# Patient Record
Sex: FEMALE | Race: ASIAN | URBAN - METROPOLITAN AREA
[De-identification: names, ages, dates, MRNs, and addresses within clinical notes are randomized per-mention and may not be internally consistent; named-entity substitution may affect disease eponyms.]

---

## 2017-07-11 ENCOUNTER — OFFICE VISIT (OUTPATIENT)
Dept: FAMILY MEDICINE | Facility: CLINIC | Age: 27
End: 2017-07-11
Payer: COMMERCIAL

## 2017-07-11 VITALS
HEART RATE: 90 BPM | BODY MASS INDEX: 20.98 KG/M2 | TEMPERATURE: 97.9 F | SYSTOLIC BLOOD PRESSURE: 118 MMHG | WEIGHT: 114 LBS | DIASTOLIC BLOOD PRESSURE: 70 MMHG | OXYGEN SATURATION: 98 % | RESPIRATION RATE: 16 BRPM | HEIGHT: 62 IN

## 2017-07-11 DIAGNOSIS — R30.0 DYSURIA: ICD-10-CM

## 2017-07-11 DIAGNOSIS — N30.01 ACUTE CYSTITIS WITH HEMATURIA: Primary | ICD-10-CM

## 2017-07-11 LAB
ALBUMIN UR-MCNC: 100 MG/DL
APPEARANCE UR: ABNORMAL
BACTERIA #/AREA URNS HPF: ABNORMAL /HPF
BILIRUB UR QL STRIP: ABNORMAL
COLOR UR AUTO: ABNORMAL
GLUCOSE UR STRIP-MCNC: NEGATIVE MG/DL
HGB UR QL STRIP: ABNORMAL
KETONES UR STRIP-MCNC: NEGATIVE MG/DL
LEUKOCYTE ESTERASE UR QL STRIP: NEGATIVE
NITRATE UR QL: POSITIVE
NON-SQ EPI CELLS #/AREA URNS LPF: ABNORMAL /LPF
PH UR STRIP: 6 PH (ref 5–7)
RBC #/AREA URNS AUTO: >100 /HPF (ref 0–2)
RENAL EPI CELLS #/AREA URNS HPF: ABNORMAL /HPF
SP GR UR STRIP: 1.02 (ref 1–1.03)
URN SPEC COLLECT METH UR: ABNORMAL
UROBILINOGEN UR STRIP-ACNC: 1 EU/DL (ref 0.2–1)
WBC #/AREA URNS AUTO: ABNORMAL /HPF (ref 0–2)

## 2017-07-11 PROCEDURE — 81001 URINALYSIS AUTO W/SCOPE: CPT | Performed by: NURSE PRACTITIONER

## 2017-07-11 PROCEDURE — 99213 OFFICE O/P EST LOW 20 MIN: CPT | Performed by: NURSE PRACTITIONER

## 2017-07-11 PROCEDURE — 87086 URINE CULTURE/COLONY COUNT: CPT | Performed by: NURSE PRACTITIONER

## 2017-07-11 PROCEDURE — 87088 URINE BACTERIA CULTURE: CPT | Performed by: NURSE PRACTITIONER

## 2017-07-11 PROCEDURE — 87186 SC STD MICRODIL/AGAR DIL: CPT | Performed by: NURSE PRACTITIONER

## 2017-07-11 RX ORDER — SULFAMETHOXAZOLE/TRIMETHOPRIM 800-160 MG
1 TABLET ORAL 2 TIMES DAILY
Qty: 6 TABLET | Refills: 0 | Status: SHIPPED | OUTPATIENT
Start: 2017-07-11 | End: 2017-07-14

## 2017-07-11 NOTE — NURSING NOTE
"Chief Complaint   Patient presents with     Urinary Problem       Initial BP (!) 138/96  Pulse 90  Temp 97.9  F (36.6  C) (Oral)  Resp 16  Ht 5' 1.5\" (1.562 m)  Wt 114 lb (51.7 kg)  LMP 06/26/2017  SpO2 98%  Breastfeeding? No  BMI 21.19 kg/m2 Estimated body mass index is 21.19 kg/(m^2) as calculated from the following:    Height as of this encounter: 5' 1.5\" (1.562 m).    Weight as of this encounter: 114 lb (51.7 kg).  Medication Reconciliation: complete       Brad Constantino MA       "

## 2017-07-11 NOTE — PATIENT INSTRUCTIONS
"Your urine sample today does show that you have an infection.  Take the antibiotics as prescribed.  You can also take over the counter uristat or azo for the bladder irritation and pain.  Push fluids, drink lots of fluids, void frequently, wipe from front to back etc.  Follow up with me with any lingering or concerning issues.         * BLADDER INFECTION,Female (Adult)    A bladder infection (\"cystitis\" or \"UTI\") usually causes a constant urge to urinate and a burning when passing urine. Urine may be cloudy, smelly or dark. There may be pain in the lower abdomen. A bladder infection occurs when bacteria from the vaginal area enter the bladder opening (urethra). This can occur from sexual intercourse, wearing tight clothing, dehydration and other factors.  HOME CARE:  1. Drink lots of fluids (at least 6-8 glasses a day, unless you must restrict fluids for other medical reasons). This will force the medicine into your urinary system and flush the bacteria out of your body. Cranberry juice has been shown to help clear out the bacteria.  2. Avoid sexual intercourse until your symptoms are gone.  3. A bladder infection is treated with antibiotics. You may also be given Pyridium (generic = phenazopyridine) to reduce the burning sensation. This medicine will cause your urine to become a bright orange color. The orange urine may stain clothing. You may wear a pad or panty-liner to protect clothing.  PREVENTING FUTURE INFECTIONS:  1. Always wipe from front to back after a bowel movement.  2. Keep the genital area clean and dry.  3. Drink plenty of fluids each day to avoid dehydration.  4. Urinate right after intercourse to flush out the bladder.  5. Wear cotton underwear and cotton-lined panty hose; avoid tight-fitting pants.  6. If you are on birth control pills and are having frequent bladder infections, discuss with your doctor.  FOLLOW UP: Return to this facility or see your doctor if ALL symptoms are not gone after " three days of treatment.  GET PROMPT MEDICAL ATTENTION if any of the following occur:    Fever over 101 F (38.3 C)    No improvement by the third day of treatment    Increasing back or abdominal pain    Repeated vomiting; unable to keep medicine down    Weakness, dizziness or fainting    Vaginal discharge    Pain, redness or swelling in the labia (outer vaginal area)    1258-6993 [employer], [address] [place] [address], [place] [address]. All rights reserved. This information is not intended as a substitute for professional medical care. Always follow your healthcare professional's instructions.

## 2017-07-11 NOTE — PROGRESS NOTES
"  SUBJECTIVE:                                                    Lili Jesus is a 27 year old female who presents to clinic today for the following health issues:      URINARY TRACT SYMPTOMS      Duration: Sunday night (7/9/2017)    Description  Onset 2 days ago with c/o lower pelvic pain and bladder \"tightening.\"  Her symptoms improved yesterday with a Danish home remedy (uncertain what it was) but today her symptoms are worse again.  Today she has noticed that her urine is reddish orange.  No fever or chills.      Intensity:  moderate, severe    Accompanying signs and symptoms:  Fever/chills: no   Flank pain no   Nausea and vomiting: no   Vaginal symptoms: none  Abdominal/Pelvic Pain: YES- low abdominal pain. Tightening feeling    History  History of frequent UTI's: YES  History of kidney stones: no   Sexually Active: YES  Possibility of pregnancy: No    Precipitating or alleviating factors: None    Therapies tried and outcome: pyridium    Outcome:       Problem list and histories reviewed & adjusted, as indicated.  Additional history: as documented    Patient Active Problem List   Diagnosis     Malpositioned IUD     Past Surgical History:   Procedure Laterality Date     LAPAROSCOPY OPERATIVE ADULT N/A 3/20/2015    Procedure: LAPAROSCOPY OPERATIVE ADULT;  Surgeon: Antonietta Alfonso MD;  Location: [facility]     NO HISTORY OF SURGERY         Social History   Substance Use Topics     Smoking status: Current Every Day Smoker     Types: Cigarettes     Smokeless tobacco: Never Used      Comment: smokes about 4 cigarettes per day     Alcohol use No     Family History   Problem Relation Age of Onset     Family History Negative Other          Current Outpatient Prescriptions   Medication Sig Dispense Refill     ibuprofen (ADVIL,MOTRIN) 600 MG tablet Take 1 tablet (600 mg) by mouth every 6 hours as needed for pain (mild) (Patient not taking: Reported on 7/11/2017) 30 tablet 0     No Known Allergies  No lab results found.   BP " "Readings from Last 3 Encounters:   07/11/17 (!) 138/96   03/20/15 123/83   02/24/15 101/64    Wt Readings from Last 3 Encounters:   07/11/17 114 lb (51.7 kg)   03/20/15 111 lb 15.9 oz (50.8 kg)   02/24/15 116 lb 4.8 oz (52.8 kg)                  Labs reviewed in EPIC    Reviewed and updated as needed this visit by clinical staff       Reviewed and updated as needed this visit by Provider         ROS:  Constitutional, HEENT, cardiovascular, pulmonary, GI, , musculoskeletal, neuro, skin, endocrine and psych systems are negative, except as otherwise noted.    OBJECTIVE:     BP (!) 138/96  Pulse 90  Temp 97.9  F (36.6  C) (Oral)  Resp 16  Ht 5' 1.5\" (1.562 m)  Wt 114 lb (51.7 kg)  LMP 06/26/2017  SpO2 98%  Breastfeeding? No  BMI 21.19 kg/m2  Body mass index is 21.19 kg/(m^2).  GENERAL: healthy, alert and no distress. Happy, smiling, conversing.  NECK: no adenopathy  RESP: lungs clear to auscultation - no rales, rhonchi or wheezes  CV: regular rate and rhythm, normal S1 S2, no S3 or S4, no murmur, click or rub, no peripheral edema and peripheral pulses strong  ABDOMEN: soft, nontender, no hepatosplenomegaly, no masses and bowel sounds normal.  No rebound or guarding.  No CVA tenderness with percussion.  She does complain of lower mid pelvic discomfort with palpation.  SKIN: warm and dry    Results for orders placed or performed in visit on 07/11/17   *UA reflex to Microscopic and Culture ([facility] (except [place] and [place])   Result Value Ref Range    Color Urine Yani     Appearance Urine Cloudy     Glucose Urine Negative NEG mg/dL    Bilirubin Urine (A) NEG     Small  This is an unconfirmed screening test result. A positive result may be false.      Ketones Urine Negative NEG mg/dL    Specific Gravity Urine 1.020 1.003 - 1.035    Blood Urine Large (A) NEG    pH Urine 6.0 5.0 - 7.0 pH    Protein Albumin Urine 100 (A) NEG mg/dL    Urobilinogen Urine 1.0 0.2 - 1.0 EU/dL    Nitrite Urine " Positive (A) NEG    Leukocyte Esterase Urine Negative NEG    Source Midstream Urine    Urine Microscopic   Result Value Ref Range    WBC Urine O - 2 0 - 2 /HPF    RBC Urine >100 (A) 0 - 2 /HPF    Squamous Epithelial /LPF Urine Few FEW /LPF    Renal Tub Epi Few (A) NEG /HPF    Bacteria Urine Few (A) NEG /HPF         ASSESSMENT/PLAN:     (N30.01) Acute cystitis with hematuria  (primary encounter diagnosis)  Comment: Acute  Plan: sulfamethoxazole-trimethoprim (BACTRIM         DS/SEPTRA DS) 800-160 MG per tablet        I discussed and reviewed with the patient her abnormal urine sample, the blood, etc.  I do have some concerns at this may be higher than bladder due to the protein seen, however this patient is nontender to her flank areas, afebrile, etc.  For now we will treat his significant bladder infection.  I discussed and reviewed the significance of increasing her water intake, taking her medications as prescribed, voiding frequently, wiping from front to back, etc.  She is to monitor for new or worsening symptoms.  I did ask her to follow up for let me know if her symptoms did not significantly improve within the next 2-3 days. The urine culture is pending.  I would add additional antibiotics if needed.  Consider urology consultation, but this is not seen necessary today.      (R30.0) Dysuria  Comment:   Plan: *UA reflex to Microscopic and Culture ([place]         and [facility] (except [facility] [place] and         [place]), Urine Microscopic, Urine Culture         Aerobic Bacterial              Patient Instructions   Your urine sample today does show that you have an infection.  Take the antibiotics as prescribed.  You can also take over the counter uristat or azo for the bladder irritation and pain.  Push fluids, drink lots of fluids, void frequently, wipe from front to back etc.  Follow up with me with any lingering or concerning issues.         * BLADDER INFECTION,Female (Adult)    A bladder infection  "(\"cystitis\" or \"UTI\") usually causes a constant urge to urinate and a burning when passing urine. Urine may be cloudy, smelly or dark. There may be pain in the lower abdomen. A bladder infection occurs when bacteria from the vaginal area enter the bladder opening (urethra). This can occur from sexual intercourse, wearing tight clothing, dehydration and other factors.  HOME CARE:  1. Drink lots of fluids (at least 6-8 glasses a day, unless you must restrict fluids for other medical reasons). This will force the medicine into your urinary system and flush the bacteria out of your body. Cranberry juice has been shown to help clear out the bacteria.  2. Avoid sexual intercourse until your symptoms are gone.  3. A bladder infection is treated with antibiotics. You may also be given Pyridium (generic = phenazopyridine) to reduce the burning sensation. This medicine will cause your urine to become a bright orange color. The orange urine may stain clothing. You may wear a pad or panty-liner to protect clothing.  PREVENTING FUTURE INFECTIONS:  1. Always wipe from front to back after a bowel movement.  2. Keep the genital area clean and dry.  3. Drink plenty of fluids each day to avoid dehydration.  4. Urinate right after intercourse to flush out the bladder.  5. Wear cotton underwear and cotton-lined panty hose; avoid tight-fitting pants.  6. If you are on birth control pills and are having frequent bladder infections, discuss with your doctor.  FOLLOW UP: Return to this facility or see your doctor if ALL symptoms are not gone after three days of treatment.  GET PROMPT MEDICAL ATTENTION if any of the following occur:    Fever over 101 F (38.3 C)    No improvement by the third day of treatment    Increasing back or abdominal pain    Repeated vomiting; unable to keep medicine down    Weakness, dizziness or fainting    Vaginal discharge    Pain, redness or swelling in the labia (outer vaginal area)    4007-0966 [employer], [address] [place] [address], [place] [address]. All rights reserved. This information is not intended as a substitute for professional medical care. Always follow your healthcare professional's instructions.              CHAD Tinajero [facility]

## 2017-07-11 NOTE — LETTER
[facility]   [address], [place]  [address]  [phone]      July 18, 2017      Lili Jesus  [address] [place] [address]              Dear Ms. Jesus,    This note is to let you know the results of your recent urine culture.    Your urine culture did come back showing a bacteria called Staphylococcus. The antibiotics as prescribed for you is considered appropriate. Please make sure to take the antibiotics as prescribed, drink plenty of water, urinating frequently and white from front to back. If your symptoms are not completely cleared by now(july 17, 2017), I would like you to be rechecked.    I hope you are doing well.     Results for orders placed or performed in visit on 07/11/17   *UA reflex to Microscopic and Culture ([place] and [facility] (except [place] and [place])   Result Value Ref Range    Color Urine Yani     Appearance Urine Cloudy     Glucose Urine Negative NEG mg/dL    Bilirubin Urine (A) NEG     Small  This is an unconfirmed screening test result. A positive result may be false.      Ketones Urine Negative NEG mg/dL    Specific Gravity Urine 1.020 1.003 - 1.035    Blood Urine Large (A) NEG    pH Urine 6.0 5.0 - 7.0 pH    Protein Albumin Urine 100 (A) NEG mg/dL    Urobilinogen Urine 1.0 0.2 - 1.0 EU/dL    Nitrite Urine Positive (A) NEG    Leukocyte Esterase Urine Negative NEG    Source Midstream Urine    Urine Microscopic   Result Value Ref Range    WBC Urine O - 2 0 - 2 /HPF    RBC Urine >100 (A) 0 - 2 /HPF    Squamous Epithelial /LPF Urine Few FEW /LPF    Renal Tub Epi Few (A) NEG /HPF    Bacteria Urine Few (A) NEG /HPF   Urine Culture Aerobic Bacterial   Result Value Ref Range    Specimen Description Midstream Urine     Culture Micro (A)      50,000 to 100,000 colonies/mL Staphylococcus saprophyticus    Micro Report Status FINAL 07/14/2017     Organism:       50,000 to 100,000 colonies/mL Staphylococcus saprophyticus       Susceptibility    50,000  to 100,000 colonies/ml staphylococcus saprophyticus (lazara) -  (no method available)     CIPROFLOXACIN <=0.5 Susceptible  ug/mL     GENTAMICIN <=0.5 Susceptible  ug/mL     LEVOFLOXACIN 0.5 Susceptible  ug/mL     NITROFURANTOIN <=16 Susceptible  ug/mL     OXACILLIN <=0.25 Susceptible  ug/mL     PENICILLIN 0.25 Resistant  ug/mL     TETRACYCLINE <=1 Susceptible  ug/mL     VANCOMYCIN <=0.5 Susceptible  ug/mL           Sincerely,    Alla Rico, CNP/nr

## 2017-07-14 ENCOUNTER — TELEPHONE (OUTPATIENT)
Dept: FAMILY MEDICINE | Facility: CLINIC | Age: 27
End: 2017-07-14

## 2017-07-14 LAB
BACTERIA SPEC CULT: ABNORMAL
MICRO REPORT STATUS: ABNORMAL
MICROORGANISM SPEC CULT: ABNORMAL
SPECIMEN SOURCE: ABNORMAL

## 2017-07-14 NOTE — LETTER
[facility]   [address], [place]  [address]  [phone]        August 8, 2017      Lili Jesus                                                                                                                                  [address] [place] [address]          Dear Ms. Jesus,    When you cam into the letter in July, your urine culture came back abnormal. If you are having any symptoms at all that your urine infection did not clear, please return to the clinic for a recheck.         Sincerely,    Alla Rico CNP/gs

## 2017-07-14 NOTE — TELEPHONE ENCOUNTER
"The results of her urine culture came back.  This showed a urine staph infection.  The antibiotic that was prescribed for her is likely not effective.  I placed a telephone call to the patient to discuss this information with her.  Her telephone did not have an option to leave a voicemail. \"The subscriber that you are trying to reach is currently not reachable\" and the call hung up.      "

## 2017-08-07 NOTE — TELEPHONE ENCOUNTER
Please draft a letter:    Dear Lili,    When you cam into the letter in July, your urine culture came back abnormal. If you are having any symptoms at all that your urine infection did not clear, please return to the clinic for a recheck.    Alla BONILLA CNP

## 2023-10-09 NOTE — MR AVS SNAPSHOT
"              After Visit Summary   7/11/2017    Lili Jesus    MRN: [id number]           Patient Information     Date Of Birth          1990        Visit Information        Provider Department      7/11/2017 8:40 AM Alla Rico APRN [facility]        Today's Diagnoses     Acute cystitis with hematuria    -  1    Dysuria          Care Instructions    Your urine sample today does show that you have an infection.  Take the antibiotics as prescribed.  You can also take over the counter uristat or azo for the bladder irritation and pain.  Push fluids, drink lots of fluids, void frequently, wipe from front to back etc.  Follow up with me with any lingering or concerning issues.         * BLADDER INFECTION,Female (Adult)    A bladder infection (\"cystitis\" or \"UTI\") usually causes a constant urge to urinate and a burning when passing urine. Urine may be cloudy, smelly or dark. There may be pain in the lower abdomen. A bladder infection occurs when bacteria from the vaginal area enter the bladder opening (urethra). This can occur from sexual intercourse, wearing tight clothing, dehydration and other factors.  HOME CARE:  1. Drink lots of fluids (at least 6-8 glasses a day, unless you must restrict fluids for other medical reasons). This will force the medicine into your urinary system and flush the bacteria out of your body. Cranberry juice has been shown to help clear out the bacteria.  2. Avoid sexual intercourse until your symptoms are gone.  3. A bladder infection is treated with antibiotics. You may also be given Pyridium (generic = phenazopyridine) to reduce the burning sensation. This medicine will cause your urine to become a bright orange color. The orange urine may stain clothing. You may wear a pad or panty-liner to protect clothing.  PREVENTING FUTURE INFECTIONS:  1. Always wipe from front to back after a bowel movement.  2. Keep the genital area clean and dry.  3. Drink " plenty of fluids each day to avoid dehydration.  4. Urinate right after intercourse to flush out the bladder.  5. Wear cotton underwear and cotton-lined panty hose; avoid tight-fitting pants.  6. If you are on birth control pills and are having frequent bladder infections, discuss with your doctor.  FOLLOW UP: Return to this facility or see your doctor if ALL symptoms are not gone after three days of treatment.  GET PROMPT MEDICAL ATTENTION if any of the following occur:    Fever over 101 F (38.3 C)    No improvement by the third day of treatment    Increasing back or abdominal pain    Repeated vomiting; unable to keep medicine down    Weakness, dizziness or fainting    Vaginal discharge    Pain, redness or swelling in the labia (outer vaginal area)    8394-3458 [employer], [address] [place] [address], [place] [address]. All rights reserved. This information is not intended as a substitute for professional medical care. Always follow your healthcare professional's instructions.            Follow-ups after your visit        Who to contact     If you have questions or need follow up information about today's clinic visit or your schedule please contact [facility] directly at [phone].  Normal or non-critical lab and imaging results will be communicated to you by MyChart, letter or phone within 4 business days after the clinic has received the results. If you do not hear from us within 7 days, please contact the clinic through [employer] or phone. If you have a critical or abnormal lab result, we will notify you by phone as soon as possible.  Submit refill requests through [employer] or call your pharmacy and they will forward the refill request to us. Please allow 3 business days for your refill to be completed.          Additional Information About Your Visit        Bring Light Information     [employer] lets you send messages to your doctor, view your test results, renew your prescriptions, schedule  "appointments and more. To sign up, go to [email or web address] . Click on \"Log in\" on the left side of the screen, which will take you to the Welcome page. Then click on \"Sign up Now\" on the right side of the page.     You will be asked to enter the access code listed below, as well as some personal information. Please follow the directions to create your username and password.     Your access code is: [email or web address]  Expires: 10/9/2017  9:18 AM     Your access code will  in 90 days. If you need help or a new code, please call your [facility] clinic or [phone].        Care EveryWhere ID     This is your Care EveryWhere ID. This could be used by other organizations to access your [facility] medical records  [id number]        Your Vitals Were     Pulse Temperature Respirations Height Last Period Pulse Oximetry    90 97.9  F (36.6  C) (Oral) 16 5' 1.5\" (1.562 m) 2017 98%    Breastfeeding? BMI (Body Mass Index)                No 21.19 kg/m2           Blood Pressure from Last 3 Encounters:   17 118/70   03/20/15 123/83   02/24/15 101/64    Weight from Last 3 Encounters:   17 114 lb (51.7 kg)   03/20/15 111 lb 15.9 oz (50.8 kg)   02/24/15 116 lb 4.8 oz (52.8 kg)              We Performed the Following     *UA reflex to Microscopic and Culture ([place] and [facility] (except [place] and [place])     Urine Culture Aerobic Bacterial     Urine Microscopic          Today's Medication Changes          These changes are accurate as of: 17  9:21 AM.  If you have any questions, ask your nurse or doctor.               Start taking these medicines.        Dose/Directions    sulfamethoxazole-trimethoprim 800-160 MG per tablet   Commonly known as:  BACTRIM DS/SEPTRA DS   Used for:  Acute cystitis with hematuria   Started by:  Alla Rico APRN CNP        Dose:  1 tablet   Take 1 tablet by mouth 2 times daily for 3 days   Quantity:  6 tablet   Refills:  0         Stop taking these " medicines if you haven't already. Please contact your care team if you have questions.     citrate of magnesia 1.745 GM/30ML Soln   Stopped by:  Alla Rico APRN CNP                Where to get your medicines      These medications were sent to [facility] [place] - [place], [place] - [address]  [address], [place] [address]     Phone:  [phone]     sulfamethoxazole-trimethoprim 800-160 MG per tablet                Primary Care Provider    Physician No Ref-Primary       No address on file        Equal Access to Services     [facility]: Hadii aad ku hadasho Soomaali, waaxda luqadaha, qaybta kaalmada adeegyada, waxefrain belle . So [facility] [phone].    ATENCIÓN: Si habla español, tiene a de la rosa disposición servicios gratuitos de asistencia lingüística. [facility] [phone].    We comply with applicable federal civil rights laws and [place] laws. We do not discriminate on the basis of race, color, national origin, age, disability sex, sexual orientation or gender identity.            Thank you!     Thank you for choosing [facility]  for your care. Our goal is always to provide you with excellent care. Hearing back from our patients is one way we can continue to improve our services. Please take a few minutes to complete the written survey that you may receive in the mail after your visit with us. Thank you!             Your Updated Medication List - Protect others around you: Learn how to safely use, store and throw away your medicines at www.disposemymeds.org.          This list is accurate as of: 7/11/17  9:21 AM.  Always use your most recent med list.                   Brand Name Dispense Instructions for use Diagnosis    ibuprofen 600 MG tablet    ADVIL/MOTRIN    30 tablet    Take 1 tablet (600 mg) by mouth every 6 hours as needed for pain (mild)    S/P laparoscopy       sulfamethoxazole-trimethoprim 800-160 MG per tablet     BACTRIM DS/SEPTRA DS    6 tablet    Take 1 tablet by mouth 2 times daily for 3 days    Acute cystitis with hematuria          Olanzapine Counseling- I discussed with the patient the common side effects of olanzapine including but are not limited to: lack of energy, dry mouth, increased appetite, sleepiness, tremor, constipation, dizziness, changes in behavior, or restlessness.  Explained that teenagers are more likely to experience headaches, abdominal pain, pain in the arms or legs, tiredness, and sleepiness.  Serious side effects include but are not limited: increased risk of death in elderly patients who are confused, have memory loss, or dementia-related psychosis; hyperglycemia; increased cholesterol and triglycerides; and weight gain.